# Patient Record
Sex: MALE | Race: ASIAN | NOT HISPANIC OR LATINO | Employment: OTHER | URBAN - METROPOLITAN AREA
[De-identification: names, ages, dates, MRNs, and addresses within clinical notes are randomized per-mention and may not be internally consistent; named-entity substitution may affect disease eponyms.]

---

## 2020-05-17 ASSESSMENT — MIFFLIN-ST. JEOR: SCORE: 1738.33

## 2020-05-18 ENCOUNTER — SURGERY - HEALTHEAST (OUTPATIENT)
Dept: SURGERY | Facility: HOSPITAL | Age: 70
End: 2020-05-18

## 2020-05-18 ENCOUNTER — ANESTHESIA - HEALTHEAST (OUTPATIENT)
Dept: SURGERY | Facility: HOSPITAL | Age: 70
End: 2020-05-18

## 2020-05-19 ENCOUNTER — TELEPHONE (OUTPATIENT)
Dept: FAMILY MEDICINE | Facility: CLINIC | Age: 70
End: 2020-05-19

## 2020-05-19 NOTE — TELEPHONE ENCOUNTER
Dr. Wilson called this morning from the hospital to register patient for HFU discharge today 5/19/2020 and have him follow up with Dr. Wilson. I was able to call and register patient but due to patients insurance not currently active or ID number has changed we were unable to set up an appointment at this time. Patient indicate he will contact his insurance to get updated information and will call clinic back to schedule appointment.

## 2021-06-04 VITALS — HEIGHT: 66 IN | BODY MASS INDEX: 36.34 KG/M2 | WEIGHT: 226.1 LBS

## 2021-06-08 NOTE — ANESTHESIA CARE TRANSFER NOTE
Last vitals:   Vitals:    05/18/20 1040   BP: 146/86   Pulse: (!) 115   Resp: 18   Temp:    SpO2: 97%     Patient's level of consciousness is drowsy  Spontaneous respirations: yes  Maintains airway independently: yes  Dentition unchanged: yes  Oropharynx: oropharynx clear of all foreign objects    QCDR Measures:  ASA# 20 - Surgical Safety Checklist: WHO surgical safety checklist completed prior to induction    PQRS# 430 - Adult PONV Prevention: 4558F - Pt received => 2 anti-emetic agents (different classes) preop & intraop  ASA# 8 - Peds PONV Prevention: NA - Not pediatric patient, not GA or 2 or more risk factors NOT present  PQRS# 424 - Edna-op Temp Management: 4559F - At least one body temp DOCUMENTED => 35.5C or 95.9F within required timeframe  PQRS# 426 - PACU Transfer Protocol: - Transfer of care checklist used  ASA# 14 - Acute Post-op Pain: ASA14B - Patient did NOT experience pain >= 7 out of 10   Temp 99.8F- cooling started

## 2021-06-08 NOTE — ANESTHESIA POSTPROCEDURE EVALUATION
Patient: Deysi Dorantes  Procedure(s):  APPENDECTOMY, LAPAROSCOPIC  Anesthesia type: general    Patient location: PACU  Last vitals:   Vitals Value Taken Time   /77 5/18/2020 11:30 AM   Temp 37.9  C (100.3  F) 5/18/2020 10:38 AM   Pulse 111 5/18/2020 11:30 AM   Resp 24 5/18/2020 11:30 AM   SpO2 92 % 5/18/2020 11:30 AM   Vitals shown include unvalidated device data.  Post vital signs: stable  Level of consciousness: alert and conversant  Post-anesthesia pain: pain controlled  Post-anesthesia nausea and vomiting: no  Pulmonary: supplemental oxygen  Cardiovascular: stable and blood pressure at baseline  Hydration: adequate   Anesthetic events: no    QCDR Measures:  ASA# 11 - Edna-op Cardiac Arrest: ASA11B - Patient did NOT experience unanticipated cardiac arrest  ASA# 12 - Edna-op Mortality Rate: ASA12B - Patient did NOT die  ASA# 13 - PACU Re-Intubation Rate: ASA13B - Patient did NOT require a new airway mgmt  ASA# 10 - Composite Anes Safety: ASA10A - No serious adverse event    Additional Notes:

## 2021-06-16 PROBLEM — K37 APPENDICITIS: Status: ACTIVE | Noted: 2020-05-17

## 2021-06-16 PROBLEM — K35.30 ACUTE APPENDICITIS WITH LOCALIZED PERITONITIS, WITHOUT PERFORATION, ABSCESS, OR GANGRENE: Status: ACTIVE | Noted: 2020-05-17

## 2021-07-03 NOTE — ANESTHESIA PREPROCEDURE EVALUATION
Anesthesia Preprocedure Evaluation by Enzo Suresh MD at 5/18/2020  8:19 AM     Author: Enzo Suresh MD Service: -- Author Type: Physician    Filed: 5/18/2020  9:24 AM Date of Service: 5/18/2020  8:19 AM Status: Addendum    : Enzo Suresh MD (Physician)    Related Notes: Original Note by Enzo Suresh MD (Physician) filed at 5/18/2020  8:22 AM       Anesthesia Evaluation      Patient summary reviewed   No history of anesthetic complications     Airway   Mallampati: III  Neck ROM: full   Pulmonary - negative ROS and normal exam                          Cardiovascular - negative ROS and normal exam  Exercise tolerance: > or = 4 METS   Neuro/Psych - negative ROS     Endo/Other    (+) obesity,      GI/Hepatic/Renal - negative ROS      Other findings: CT showed cirrhosis plus portal venous HTN and prominent GE varices. BMI 36+.  Covid 5/17 negative.  K 4.1, GFR>60, t-bili 1.7, Hg 15.2, Plts 103K.   Tongue surface is blackened.      Dental - normal exam                            Anesthesia Plan  Planned anesthetic: general endotracheal  Ketamine after induction.  ASA 3   Induction: intravenous   Anesthetic plan and risks discussed with: patient  Anesthesia plan special considerations: increased risk of difficult airway, antiemetics,   Post-op plan: routine recovery

## 2021-11-21 ENCOUNTER — APPOINTMENT (OUTPATIENT)
Dept: RADIOLOGY | Facility: HOSPITAL | Age: 71
End: 2021-11-21
Payer: MEDICARE

## 2021-11-21 ENCOUNTER — HOSPITAL ENCOUNTER (EMERGENCY)
Facility: HOSPITAL | Age: 71
Discharge: HOME | End: 2021-11-21
Admitting: PHYSICIAN ASSISTANT
Payer: MEDICARE

## 2021-11-21 VITALS
HEIGHT: 66 IN | SYSTOLIC BLOOD PRESSURE: 147 MMHG | RESPIRATION RATE: 28 BRPM | TEMPERATURE: 98.5 F | OXYGEN SATURATION: 99 % | HEART RATE: 68 BPM | WEIGHT: 185 LBS | DIASTOLIC BLOOD PRESSURE: 91 MMHG | BODY MASS INDEX: 29.73 KG/M2

## 2021-11-21 DIAGNOSIS — R07.9 CHEST PAIN: ICD-10-CM

## 2021-11-21 LAB
ANION GAP SERPL CALCULATED.3IONS-SCNC: 9 MMOL/L (ref 5–18)
BASOPHILS # BLD AUTO: 0 10E3/UL (ref 0–0.2)
BASOPHILS NFR BLD AUTO: 1 %
BUN SERPL-MCNC: 15 MG/DL (ref 8–22)
CALCIUM SERPL-MCNC: 9.1 MG/DL (ref 8.5–10.5)
CHLORIDE BLD-SCNC: 107 MMOL/L (ref 98–107)
CO2 SERPL-SCNC: 22 MMOL/L (ref 22–31)
CREAT SERPL-MCNC: 0.92 MG/DL (ref 0.7–1.3)
D DIMER PPP FEU-MCNC: 0.36 UG/ML FEU (ref 0–0.5)
EOSINOPHIL # BLD AUTO: 0.1 10E3/UL (ref 0–0.7)
EOSINOPHIL NFR BLD AUTO: 3 %
ERYTHROCYTE [DISTWIDTH] IN BLOOD BY AUTOMATED COUNT: 12.4 % (ref 10–15)
GFR SERPL CREATININE-BSD FRML MDRD: 85 ML/MIN/1.73M2
GLUCOSE BLD-MCNC: 133 MG/DL (ref 70–125)
HCT VFR BLD AUTO: 42.5 % (ref 40–53)
HGB BLD-MCNC: 14.6 G/DL (ref 13.3–17.7)
HOLD SPECIMEN: NORMAL
IMM GRANULOCYTES # BLD: 0 10E3/UL
IMM GRANULOCYTES NFR BLD: 0 %
LYMPHOCYTES # BLD AUTO: 1 10E3/UL (ref 0.8–5.3)
LYMPHOCYTES NFR BLD AUTO: 20 %
MAGNESIUM SERPL-MCNC: 2.1 MG/DL (ref 1.8–2.6)
MCH RBC QN AUTO: 31.9 PG (ref 26.5–33)
MCHC RBC AUTO-ENTMCNC: 34.4 G/DL (ref 31.5–36.5)
MCV RBC AUTO: 93 FL (ref 78–100)
MONOCYTES # BLD AUTO: 0.6 10E3/UL (ref 0–1.3)
MONOCYTES NFR BLD AUTO: 12 %
NEUTROPHILS # BLD AUTO: 3 10E3/UL (ref 1.6–8.3)
NEUTROPHILS NFR BLD AUTO: 64 %
NRBC # BLD AUTO: 0 10E3/UL
NRBC BLD AUTO-RTO: 0 /100
PLATELET # BLD AUTO: 94 10E3/UL (ref 150–450)
POTASSIUM BLD-SCNC: 3.6 MMOL/L (ref 3.5–5)
RBC # BLD AUTO: 4.58 10E6/UL (ref 4.4–5.9)
SODIUM SERPL-SCNC: 138 MMOL/L (ref 136–145)
TROPONIN I SERPL-MCNC: 0.02 NG/ML (ref 0–0.29)
TROPONIN I SERPL-MCNC: 0.03 NG/ML (ref 0–0.29)
WBC # BLD AUTO: 4.7 10E3/UL (ref 4–11)

## 2021-11-21 PROCEDURE — 84484 ASSAY OF TROPONIN QUANT: CPT | Performed by: PHYSICIAN ASSISTANT

## 2021-11-21 PROCEDURE — 93005 ELECTROCARDIOGRAM TRACING: CPT | Performed by: PHYSICIAN ASSISTANT

## 2021-11-21 PROCEDURE — 80048 BASIC METABOLIC PNL TOTAL CA: CPT | Performed by: PHYSICIAN ASSISTANT

## 2021-11-21 PROCEDURE — 250N000013 HC RX MED GY IP 250 OP 250 PS 637: Performed by: PHYSICIAN ASSISTANT

## 2021-11-21 PROCEDURE — 85379 FIBRIN DEGRADATION QUANT: CPT | Performed by: PHYSICIAN ASSISTANT

## 2021-11-21 PROCEDURE — 36415 COLL VENOUS BLD VENIPUNCTURE: CPT | Performed by: PHYSICIAN ASSISTANT

## 2021-11-21 PROCEDURE — 99285 EMERGENCY DEPT VISIT HI MDM: CPT | Mod: 25

## 2021-11-21 PROCEDURE — 250N000009 HC RX 250: Performed by: PHYSICIAN ASSISTANT

## 2021-11-21 PROCEDURE — 71045 X-RAY EXAM CHEST 1 VIEW: CPT

## 2021-11-21 PROCEDURE — 85025 COMPLETE CBC W/AUTO DIFF WBC: CPT | Performed by: PHYSICIAN ASSISTANT

## 2021-11-21 PROCEDURE — 83735 ASSAY OF MAGNESIUM: CPT | Performed by: PHYSICIAN ASSISTANT

## 2021-11-21 RX ORDER — ASPIRIN 81 MG/1
324 TABLET, CHEWABLE ORAL ONCE
Status: COMPLETED | OUTPATIENT
Start: 2021-11-21 | End: 2021-11-21

## 2021-11-21 RX ORDER — NITROGLYCERIN 0.4 MG/1
0.4 TABLET SUBLINGUAL EVERY 5 MIN PRN
Status: COMPLETED | OUTPATIENT
Start: 2021-11-21 | End: 2021-11-21

## 2021-11-21 RX ADMIN — NITROGLYCERIN 0.4 MG: 0.4 TABLET SUBLINGUAL at 08:17

## 2021-11-21 RX ADMIN — NITROGLYCERIN 0.4 MG: 0.4 TABLET SUBLINGUAL at 08:31

## 2021-11-21 RX ADMIN — LIDOCAINE HYDROCHLORIDE 30 ML: 20 SOLUTION ORAL; TOPICAL at 11:15

## 2021-11-21 RX ADMIN — NITROGLYCERIN 0.4 MG: 0.4 TABLET SUBLINGUAL at 08:23

## 2021-11-21 RX ADMIN — ASPIRIN 81 MG CHEWABLE TABLET 324 MG: 81 TABLET CHEWABLE at 08:14

## 2021-11-21 ASSESSMENT — ENCOUNTER SYMPTOMS
FEVER: 0
VOMITING: 0
COUGH: 0
HEADACHES: 0
MYALGIAS: 0
CHILLS: 0
ABDOMINAL PAIN: 0
SHORTNESS OF BREATH: 0
NAUSEA: 0
SORE THROAT: 0
NUMBNESS: 0
DIARRHEA: 0

## 2021-11-21 ASSESSMENT — MIFFLIN-ST. JEOR: SCORE: 1546.9

## 2021-11-21 NOTE — DISCHARGE INSTRUCTIONS
You were seen in the emergency department for chest pain. At this time, your blood work and imaging are very reassuring. However, I am not entirely sure what is causing this so please follow up with rapid access cardiology clinic and see your primary care provider.    Take care of yourself at home.  - start taking pepcid two times a day in case this is coming from your stomach.    For pain or fever you may take:  - Tylenol 650 mg every 6 hours. Max 4000 mg in 24 hours.   - Please do not take this medication with alcohol as it can cause problems with your liver.    Please follow up with  primary care provider and rapid access clinic to ensure your symptoms are improving.    Return to the emergency department if:  - You develop a fever (100.4F or above)  - Your symptoms worsen  - you faint  - you become very short of breath  - Or with any other concerning symptom

## 2021-11-21 NOTE — ED NOTES
Slight change in chest pain from 6/10 to 5/10 after nitroglycerin x3. Pt states chest pain feels like a popping sensation as if there is a burst from left side of chest into left arm.

## 2021-11-21 NOTE — ED TRIAGE NOTES
Pt c/o left sided chest pain that started 3-4 days ago.  Pt states pain is there all the time except when he takes tylenol goes down but as soon as tylenol wears off it comes back again.  Pt denies any injury, sob, pain with deep breaths and or feeling lightheaded /dizzy.

## 2021-11-21 NOTE — ED NOTES
Pt reports slight improvement in chest pain after GI cocktail. Left chest pain now 4/10, radiating to left arm with rating 5/10.

## 2021-11-21 NOTE — ED PROVIDER NOTES
Emergency Department Encounter   NAME: Deysi Dorantes ; AGE: 71 year old male ; YOB: 1950 ; MRN: [id number] ; EVALUATION DATE & TIME: 11/21/2021  7:48 AM ; PCP: Reyna Wilson   ED PROVIDER: Machelle Erickson PA-C    Chief Complaint   Patient presents with     Chest Pain       Medical Decision Making & Final Diagnosis     1. Chest pain         ED Course as of 11/21/21 1300   Sun Nov 21, 2021   0804 Deysi is a 72 yo un-doctored M w PMH of s/p appendectomy who presents to the ED for eval of L sided chest pain that radiates to L shoulder.    My exam is notable for /99 and otherwise nl vital signs in a nontoxic appearing, obese man. No anterior chest wall TTP. 5/5 strength and sensation intact to light touch in all extremities. Radial and PT pulse 2+ and equal in BLEs. 2x2 Erythematous base w small papules to skin over bottom of sterum and L thoracic back.    0809 I considered multiple diagnoses including: ACS, aortic dissection, PE, taponade, pneumothorax, esophageal rupture, pericarditis, cholecystitis, pancreatitis, reactive airway disease, CHF, pneumonia, esophageal spasm, malignancy, GERD, PUD, muscle sprain, costochondritis, herpes zoster, anxiety, and other        0810 EKG reveals sinus rhythm. No acute ST elevation or depression. No pathologic arrhythmia. No prior EKG for comparison    0902 Nitroglycerin SL took pt's chest pain from 6/10 to 5/10. Last /78.       1002 Delta troponin negative.   1027 I do not believe patient symptoms secondary to aortic dissection. No history of uncontrolled HTN or prior known atherosclerotic disease. No connective tissue disorder. No ripping chest or abdominal pain. D-dimer negative. Pain does radiate into left shoulder but he has intact pulses, sensation, no focal neurologic deficits in any extremities. Chest x-ray does reveal a left basilar opacity. Suspect fat pad versus atelectasis. He has no cough, shortness of breath, nasal congestion,  "fever, or other URI symptoms suggest pneumonia. Lower suspicion of pleural effusion given presentation no shortness of breath. Do not believe there is indication for antibiotics or further work-up regarding this. Low risk for PE but given age was unable to PERC him out. D-dimer negative. Do not believe there is indication for CT PE study. No severe anemia to explain chest pain.   1029 Nothing on history to suggest acute heart failure. No shortness of breath. No evidence of fluid overload. He had no abdominal tenderness a lower suspicion of referred pain from here. No indication for LFTs or lipase. He is low risk. He is not use alcohol. GERD and esophageal spasm still possible. Low suspicion of musculoskeletal source. No anterior chest wall tenderness or pain with range of motion of left arm. CMS intact. No bruising to chest. He does have a rash to chest and back but nothing that suggest herpes zoster cellulitis. Suspect rash secondary to contact dermatitis.   1030 Patient does not doctor so he denies any risk factors for ACS however I do have a suspicion he may have hypertension, hyperlipidemia, and with advanced age and obesity he does have several risk factors. EKG is nonischemic after 3 days of symptoms\" reported is negative. Given his age I will obtain delta troponin and EKG. If these are negative we will plan for discharge and rapid access clinic follow-up as well as establishing primary care provider and strict return precautions.   1113 Delta EKG without change from baseline. Shows NSR.   1300 I reviewed the results of the work-up with the patient at the bedside.  We discussed strict return precautions and need for close follow-up.  He still has mild chest pain at the time of discharge.  Suspect GI source.  We discussed Pepcid for home but given age and risk factors we will have him follow-up with rapid access clinic.  Also discussed close follow-up with primary care and he is given information for clinics. "          ED Course   7:51 AM I met and introduced myself to the patient. I gathered initial history and performed my physical exam. We discussed plan for initial workup.   11:58 AM Rechecked and updated the patient. We discussed the plan for discharge and the patient is agreeable. Reviewed supportive cares, symptomatic treatment, outpatient follow up, and reasons to return to the Emergency Department. Patient to be discharged by ED RN.      PPE: Provider wore surgical mask, surgical cap, and gloves.     MEDICATIONS GIVEN IN THE EMERGENCY:   Medications   aspirin (ASA) chewable tablet 324 mg (324 mg Oral Given 11/21/21 0814)   nitroGLYcerin (NITROSTAT) sublingual tablet 0.4 mg (0.4 mg Sublingual Given 11/21/21 0831)   lidocaine (XYLOCAINE) 2 % 15 mL, alum & mag hydroxide-simethicone (MAALOX) 15 mL GI Cocktail (30 mLs Oral Given 11/21/21 1115)      NEW PRESCRIPTIONS STARTED AT TODAY'S ER VISIT:  New Prescriptions    No medications on file     =================================================================   History   Patient information was obtained from: patient   Use of Intrepreter: N/A    Deysi Dorantes is a 71 year old male with a relevant PMH of s/p appendectomy who presents to the ED for evaluation of left sided chest pain for 3 days.   Patient reports pain woke him up from sleep 3 days ago and has been constant since that time.  Located on left side of his chest and radiates to left shoulder.  Unable to describe the nature of the pain but rates it at 6 or 7 out of 10.  States it worsens with activity.  He has been taking Tylenol for it with some relief but as Tylenol wears off he says pain increases again.  He does not see a primary care provider.  He checks his blood pressure intermittently and finds systolics 150-160.  He has not been diagnosed with HLD or DMT2.  Denies family history of premature ACS.  No history of VTE, recent surgery or immobilization, cancer diagnosis or treatment.  Denies fever,  "chills, sore throat, cough, headache, vision changes, myalgias, difficulty breathing, chest pain worsening with breathing, abdominal pain, nausea, vomiting, diarrhea, numbness/tingling/swelling in arms or legs.  Notes mild itching to center of chest and his back.  Denies alcohol, tobacco, or other drug use.  He has not been vaccinated against COVID-19.  ______________________________________________________________________  History reviewed. No pertinent past medical history.     Past Surgical History:   Procedure Laterality Date     NO PAST SURGERIES       NM LAP,APPENDECTOMY N/A 5/18/2020    Procedure: APPENDECTOMY, LAPAROSCOPIC;  Surgeon: Gonsalo Hernandez MD;  Location: [facility] [place];  Service: General       History reviewed. No pertinent family history.    Social History     Tobacco Use     Smoking status: Never Smoker     Smokeless tobacco: Never Used   Substance Use Topics     Alcohol use: Never     Drug use: Never       REVIEW OF SYSTEMS:    Review of Systems   Constitutional: Negative for chills and fever.   HENT: Negative for sore throat.    Eyes: Negative for visual disturbance.   Respiratory: Negative for cough and shortness of breath.    Cardiovascular: Positive for chest pain. Negative for leg swelling.   Gastrointestinal: Negative for abdominal pain, diarrhea, nausea and vomiting.   Musculoskeletal: Negative for myalgias.        Positive for left shoulder pain   Skin:        Positive for itching to chest and back   Neurological: Negative for numbness and headaches.   All other systems reviewed and are negative.        Physical Exam   /83   Pulse 63   Temp 98.5  F (36.9  C)   Resp 24   Ht 1.676 m (5' 6\")   Wt 83.9 kg (185 lb)   SpO2 99%   BMI 29.86 kg/m      Physical Exam  Constitutional:       General: He is not in acute distress.     Appearance: Normal appearance. He is obese. He is not toxic-appearing.   HENT:      Head: Normocephalic.   Eyes:      Conjunctiva/sclera: Conjunctivae " normal.   Cardiovascular:      Rate and Rhythm: Normal rate and regular rhythm.      Heart sounds: Normal heart sounds.      Comments: Radial and PT pulse 2+ and equal in BLEs  Pulmonary:      Effort: Pulmonary effort is normal. No respiratory distress.      Breath sounds: Normal breath sounds. No wheezing, rhonchi or rales.   Chest:      Comments: No anterior chest wall TTP  Abdominal:      General: There is no distension.      Palpations: Abdomen is soft.      Tenderness: There is no abdominal tenderness. There is no guarding or rebound.   Musculoskeletal:         General: No swelling or deformity. Normal range of motion.      Cervical back: Normal range of motion.      Right lower leg: No edema.      Left lower leg: No edema.   Skin:     General: Skin is warm.      Comments: 2x2 Erythematous base w small papules to skin over bottom of sterum and L thoracic back   Neurological:      General: No focal deficit present.      Mental Status: He is alert.      Comments: 5/5 strength and sensation intact to light touch in all extremities   Psychiatric:         Mood and Affect: Mood normal.         Lab Work (Reviewed and Interpreted):   Labs Ordered and Resulted from Time of ED Arrival to Time of ED Departure   BASIC METABOLIC PANEL - Abnormal       Result Value    Sodium 138      Potassium 3.6      Chloride 107      Carbon Dioxide (CO2) 22      Anion Gap 9      Urea Nitrogen 15      Creatinine 0.92      Calcium 9.1      Glucose 133 (*)     GFR Estimate 85     CBC WITH PLATELETS AND DIFFERENTIAL - Abnormal    WBC Count 4.7      RBC Count 4.58      Hemoglobin 14.6      Hematocrit 42.5      MCV 93      MCH 31.9      MCHC 34.4      RDW 12.4      Platelet Count 94 (*)     % Neutrophils 64      % Lymphocytes 20      % Monocytes 12      % Eosinophils 3      % Basophils 1      % Immature Granulocytes 0      NRBCs per 100 WBC 0      Absolute Neutrophils 3.0      Absolute Lymphocytes 1.0      Absolute Monocytes 0.6      Absolute  Eosinophils 0.1      Absolute Basophils 0.0      Absolute Immature Granulocytes 0.0      Absolute NRBCs 0.0     D DIMER QUANTITATIVE - Normal    D-Dimer Quantitative 0.36     TROPONIN I - Normal    Troponin I 0.02     MAGNESIUM - Normal    Magnesium 2.1     TROPONIN I - Normal    Troponin I 0.03         Imaging (Reviewed and Interpreted):   XR Chest Port 1 View   Final Result   IMPRESSION: A left basilar opacity may represent pleural fluid, atelectasis, extrapleural fat, or airspace disease. No pneumothorax. The heart is at the upper limits of normal in size.           EKG (Reviewed and Interpreted):   EKG results reviewed and interpreted by Dr. Duc ED MD.     Performed at: 0757    Impression: Sinus rhythm. Normal EKG.    Rate: 76 bpm  Rhythm: sinus  Axis: 70  IN Interval: 174 ms  QRS Interval: 92 ms  QTc Interval: 423 ms  Comparison: No previous EKGs available    EKG #2:  EKG results reviewed and interpreted by Dr. Sandy ED MD.     Performed at: 1104    Impression: Normal sinus rhythm. Normal EKG.     Rate: 62 BPM  Rhythm: normal sinus  Axis: 38  IN Interval: 178 ms  QRS Interval: 90 ms  QTc Interval: 416 ms  Comparison: When compared with EKG of 21-Nov-2021 0757, No significant change was found.     I have independently reviewed and interpreted the EKG(s) documented above.     I, Neli Steward, am serving as a scribe to document services personally performed by Machelle Erickson PA-C, based on my observation and the provider's statements to me. IMachelle PA-C attest that Neli Steward is acting in a scribe capacity, has observed my performance of the services and has documented them in accordance with my direction.     Machelle Erickson PA-C   Emergency Medicine   [facility] EMERGENCY DEPARTMENT  [address] [id number] [phone]  Dept: [phone]          Machelle Erickson PA-C  11/21/21 6567